# Patient Record
(demographics unavailable — no encounter records)

---

## 2025-06-13 NOTE — DISCUSSION/SUMMARY
[FreeTextEntry1] : Rapid strep negative so sending throat culture specimen to lab. Warm salt water gargles, URI supportive care and fever management reviewed. Continue adequate PO fluid intake and good nourishment as tolerated. F/U in 3-5 days if not resolving.

## 2025-06-13 NOTE — PHYSICAL EXAM
[Alert] : alert [EOMI] : grossly EOMI [Clear] : right tympanic membrane clear [Pink Nasal Mucosa] : pink nasal mucosa [Erythematous Oropharynx] : erythematous oropharynx [Supple] : supple [FROM] : full passive range of motion [Symmetric Chest Wall] : symmetric chest wall [Clear to Auscultation Bilaterally] : clear to auscultation bilaterally [Regular Rate and Rhythm] : regular rate and rhythm [Normal S1, S2 audible] : normal S1, S2 audible [Soft] : soft [Normal Bowel Sounds] : normal bowel sounds [Acute Distress] : no acute distress [Tenderness] : no tenderness [Enlarged Tonsils] : tonsils not enlarged [Vesicles] : no vesicles [Exudate] : no exudate [Ulcerative Lesions] : no ulcerative lesions [Palate petechiae] : palate without petechiae [Murmur] : no murmur [Tender] : nontender [Distended] : nondistended [Hepatosplenomegaly] : no hepatosplenomegaly [FreeTextEntry1] : T101.5F [FreeTextEntry4] : wet popaque mucus seen within nostrils [de-identified] : 1cm palpable nontender tonsillar nodes bilaterally

## 2025-06-13 NOTE — HISTORY OF PRESENT ILLNESS
[de-identified] : Sore throat, cough, sneeze [FreeTextEntry6] : Since the beginning of the week, she has a sore throat, slight upset stomach, sneezing, some cough, raspy voice. Fever to 101F started today. Nose is stuffy. She is still eating, sometimes has felt nauseous. No one is sick at home. This office is her medical home.

## 2025-06-13 NOTE — REVIEW OF SYSTEMS
[Fever] : fever [Nasal Discharge] : nasal discharge [Nasal Congestion] : nasal congestion [Sore Throat] : sore throat [Cough] : cough [Negative] : Cardiovascular [Chills] : no chills [Shortness of Breath] : no shortness of breath [Vomiting] : no vomiting [Diarrhea] : no diarrhea [Constipation] : no constipation